# Patient Record
Sex: MALE | Race: WHITE | Employment: FULL TIME | URBAN - NONMETROPOLITAN AREA
[De-identification: names, ages, dates, MRNs, and addresses within clinical notes are randomized per-mention and may not be internally consistent; named-entity substitution may affect disease eponyms.]

---

## 2021-03-11 ENCOUNTER — IMMUNIZATION (OUTPATIENT)
Dept: FAMILY MEDICINE | Facility: OTHER | Age: 46
End: 2021-03-11
Attending: FAMILY MEDICINE
Payer: COMMERCIAL

## 2021-03-11 PROCEDURE — 91300 PR COVID VAC PFIZER DIL RECON 30 MCG/0.3 ML IM: CPT

## 2021-03-11 PROCEDURE — 0001A PR COVID VAC PFIZER DIL RECON 30 MCG/0.3 ML IM: CPT

## 2021-03-30 ENCOUNTER — IMMUNIZATION (OUTPATIENT)
Dept: FAMILY MEDICINE | Facility: OTHER | Age: 46
End: 2021-03-30
Attending: FAMILY MEDICINE
Payer: COMMERCIAL

## 2021-03-30 PROCEDURE — 0002A PR COVID VAC PFIZER DIL RECON 30 MCG/0.3 ML IM: CPT

## 2021-03-30 PROCEDURE — 91300 PR COVID VAC PFIZER DIL RECON 30 MCG/0.3 ML IM: CPT

## 2021-11-28 ENCOUNTER — HEALTH MAINTENANCE LETTER (OUTPATIENT)
Age: 46
End: 2021-11-28

## 2022-09-04 ENCOUNTER — HEALTH MAINTENANCE LETTER (OUTPATIENT)
Age: 47
End: 2022-09-04

## 2023-01-15 ENCOUNTER — HEALTH MAINTENANCE LETTER (OUTPATIENT)
Age: 48
End: 2023-01-15

## 2024-02-18 ENCOUNTER — HEALTH MAINTENANCE LETTER (OUTPATIENT)
Age: 49
End: 2024-02-18

## 2024-02-26 ENCOUNTER — HOSPITAL ENCOUNTER (OUTPATIENT)
Facility: HOSPITAL | Age: 49
Setting detail: RECURRING SERIES
Discharge: HOME | End: 2024-02-29
Payer: COMMERCIAL

## 2024-02-26 PROCEDURE — 97110 THERAPEUTIC EXERCISES: CPT

## 2024-02-26 PROCEDURE — 97162 PT EVAL MOD COMPLEX 30 MIN: CPT

## 2024-02-26 NOTE — PROGRESS NOTES
PHYSICAL / OCCUPATIONAL THERAPY - DAILY TREATMENT NOTE (updated )  For Eval visit    Patient Name: Dejuan Church    Date: 2024    : 1975  Insurance: Payor: AETNA / Plan: AETNA / Product Type: *No Product type* /      Patient  verified yes     Visit #   Current / Total 1 24   Time   In / Out 1105 1135   Pain   In / Out 1/10 1/10   Subjective Functional Status/Changes: See POC     TREATMENT AREA =  see POC    OBJECTIVE           20 min   Eval - untimed                      Therapeutic Procedures:    Tx Min Billable or 1:1 Min (if diff from Tx Min) Procedure, Rationale, Specifics   10  63410 Therapeutic Exercise (timed):  increase ROM, strength, coordination, balance, and proprioception to improve patient's ability to progress to PLOF and address remaining functional goals. (see flow sheet as applicable)     Details if applicable:              Details if applicable:            Details if applicable:            Details if applicable:            Details if applicable:     10  [facility] Totals Reminder: bill using total billable min of TIMED therapeutic procedures (example: do not include dry needle or estim unattended, both untimed codes, in totals to left)  8-22 min = 1 unit; 23-37 min = 2 units; 38-52 min = 3 units; 53-67 min = 4 units; 68-82 min = 5 units   Total Total     [x]  Patient Education billed concurrently with other procedures   [x] Review HEP    [] Progressed/Changed HEP, detail:    [] Other detail:       Objective Information/Functional Measures/Assessment    See POC    Patient will continue to benefit from skilled PT / OT services to modify and progress therapeutic interventions, analyze and address functional mobility deficits, analyze and address ROM deficits, analyze and address strength deficits, analyze and address soft tissue restrictions, analyze and cue for proper movement patterns, analyze and modify for postural abnormalities, and analyze and address

## 2024-02-26 NOTE — THERAPY EVALUATION
[facility]  [address] [place] [address]  Phone: [phone]   [phone]  Plan of Care / Statement of Necessity for Physical Therapy Services     Patient Name: Dejuan Church : 1975   Medical   Diagnosis: Pain in right shoulder [M25.511] Treatment Diagnosis:  M25.511  RIGHT SHOULDER PAIN    Onset Date: DOS 24     Referral Source: Terry Almonte MD Start of Care (SOC): 2024   Prior Hospitalization: See medical history Provider #: [id number]   Prior Level of Function: No difficulty with reaching, lifting, pushing, pulling, carrying or recreational activities.    Comorbidities: None     Assessment / key information: Pt is a 49 y/o male who presents to PT w/ c/o R shoulder pain s/p R clavicle fracture and resulting ORIF on 24. Patient states on 24 he was playing hockey when he fell full bodyweight on his R shoulder. Patient went to ER and was diagnosed with a clavicle fracture requiring surgical intervention. Patient states he has been doing well since surgery with minimal pain in his clavicle and instead feeling more pain around his shoulder. Patient also had experienced fluid accumulation around the surgical site which was thought to potentially be an infection, and was placed on anti-biotics. Patient feels the area looks better now and will follow up with the MD on 24 to rule out this out. Patient has maintained consistency with his sling. Pt notes pain ranges 1/10 to 4/10, made worse with active movement of R shoulder; better with rest. Describes pain as achy, located R shoulder. Testing/imaging has included x-rays before and after surgery. Prior treatment has included none. Red flags none. FOTO 39/100     Clinical Exam Findings:  POSTURE/OBSERVATION: Patient currently utilizing sling in correct position. Incision present over R clavicle consistent with surgical procedure. Minor drainage noted on guaze

## 2024-02-29 ENCOUNTER — HOSPITAL ENCOUNTER (OUTPATIENT)
Facility: HOSPITAL | Age: 49
Setting detail: RECURRING SERIES
End: 2024-02-29
Payer: COMMERCIAL

## 2024-02-29 PROCEDURE — 97140 MANUAL THERAPY 1/> REGIONS: CPT

## 2024-02-29 PROCEDURE — 97110 THERAPEUTIC EXERCISES: CPT

## 2024-02-29 PROCEDURE — 97530 THERAPEUTIC ACTIVITIES: CPT

## 2024-02-29 NOTE — PROGRESS NOTES
PHYSICAL / OCCUPATIONAL THERAPY - DAILY TREATMENT NOTE    Patient Name: Dejuan Church    Date: 2024    : 1975  Insurance: Payor: AETNA / Plan: AETNA / Product Type: *No Product type* /      Patient  verified Yes     Visit #   Current / Total 2 24   Time   In / Out 1106 1136   Pain   In / Out 010 0/10   Subjective Functional Status/Changes: Patient reports he didn't experience any residual soreness after his initial evaluation. Patient reports the follow up with his MD went well and they don't think there is any infection. Patient follows up again with their office next week once he completes the anti-biotics.      TREATMENT AREA =  Pain in right shoulder [M25.511]    OBJECTIVE         Therapeutic Procedures:    Tx Min Billable or 1:1 Min (if diff from Tx Min) Procedure, Rationale, Specifics   10  77889 Therapeutic Exercise (timed):  increase ROM, strength, coordination, balance, and proprioception to improve patient's ability to progress to PLOF and address remaining functional goals. (see flow sheet as applicable)     Details if applicable:       8  74940 Therapeutic Activity (timed):  use of dynamic activities replicating functional movements to increase ROM, strength, coordination, balance, and proprioception in order to improve patient's ability to progress to PLOF and address remaining functional goals.  (see flow sheet as applicable)     Details if applicable:     12  12496 Manual Therapy (timed):  decrease pain, increase ROM, increase tissue extensibility, and decrease trigger points to improve patient's ability to progress to PLOF and address remaining functional goals.  The manual therapy interventions were performed at a separate and distinct time from the therapeutic activities interventions . (see flow sheet as applicable)     Details if applicable:  STM to R infraspinatus, R anterior deltoid; R shoulder PROM into all planes all performed in supine          Details if

## 2024-03-06 ENCOUNTER — HOSPITAL ENCOUNTER (OUTPATIENT)
Facility: HOSPITAL | Age: 49
Setting detail: RECURRING SERIES
Discharge: HOME | End: 2024-03-09
Payer: COMMERCIAL

## 2024-03-06 PROCEDURE — 97110 THERAPEUTIC EXERCISES: CPT

## 2024-03-06 PROCEDURE — 97140 MANUAL THERAPY 1/> REGIONS: CPT

## 2024-03-06 PROCEDURE — 97530 THERAPEUTIC ACTIVITIES: CPT

## 2024-03-06 NOTE — PROGRESS NOTES
PHYSICAL / OCCUPATIONAL THERAPY - DAILY TREATMENT NOTE    Patient Name: Dejuan Church    Date: 3/6/2024    : 1975  Insurance: Payor: AETNA / Plan: AETNA / Product Type: *No Product type* /      Patient  verified Yes     Visit #   Current / Total 3 24   Time   In / Out 221 259   Pain   In / Out 0/10 010   Subjective Functional Status/Changes: Patient reports the front of his shoulder/chest feels tight and lightly spasms. Patient denies any increased pain after his last session     TREATMENT AREA =  Pain in right shoulder [M25.511]    OBJECTIVE         Therapeutic Procedures:    Tx Min Billable or 1:1 Min (if diff from Tx Min) Procedure, Rationale, Specifics   18  50595 Therapeutic Exercise (timed):  increase ROM, strength, coordination, balance, and proprioception to improve patient's ability to progress to PLOF and address remaining functional goals. (see flow sheet as applicable)     Details if applicable:       10  08771 Therapeutic Activity (timed):  use of dynamic activities replicating functional movements to increase ROM, strength, coordination, balance, and proprioception in order to improve patient's ability to progress to PLOF and address remaining functional goals.  (see flow sheet as applicable)     Details if applicable:     10  01895 Manual Therapy (timed):  decrease pain, increase ROM, increase tissue extensibility, and decrease trigger points to improve patient's ability to progress to PLOF and address remaining functional goals.  The manual therapy interventions were performed at a separate and distinct time from the therapeutic activities interventions . (see flow sheet as applicable)     Details if applicable:  STM to R infraspinatus, R anterior deltoid; R shoulder PROM into all planes all performed in supine           Details if applicable:            Details if applicable:     38  [facility] Totals Reminder: bill using total billable min of TIMED therapeutic procedures (example:

## 2024-03-08 ENCOUNTER — HOSPITAL ENCOUNTER (OUTPATIENT)
Facility: HOSPITAL | Age: 49
Setting detail: RECURRING SERIES
Discharge: HOME | End: 2024-03-11
Payer: COMMERCIAL

## 2024-03-08 PROCEDURE — 97110 THERAPEUTIC EXERCISES: CPT

## 2024-03-08 PROCEDURE — 97530 THERAPEUTIC ACTIVITIES: CPT

## 2024-03-08 PROCEDURE — 97140 MANUAL THERAPY 1/> REGIONS: CPT

## 2024-03-08 NOTE — PROGRESS NOTES
dry needle or estim unattended, both untimed codes, in totals to left)  8-22 min = 1 unit; 23-37 min = 2 units; 38-52 min = 3 units; 53-67 min = 4 units; 68-82 min = 5 units   Total Total     [x]  Patient Education billed concurrently with other procedures   [x] Review HEP    [] Progressed/Changed HEP, detail:    [] Other detail:       Objective Information/Functional Measures/Assessment    Added wall slides with FR and retro UBE this session for improved shoulder mobility and ROM. Patient demonstrating gains in both quantity and quality of AROM into scaption compared to previous session. Patient is progressing appropriate with his PT program at this time.     Patient will continue to benefit from skilled PT / OT services to modify and progress therapeutic interventions, analyze and address functional mobility deficits, analyze and address ROM deficits, analyze and address strength deficits, analyze and address soft tissue restrictions, analyze and cue for proper movement patterns, and analyze and modify for postural abnormalities to address functional deficits and attain remaining goals.    Progress toward goals / Updated goals:  []  See Progress Note/Recertification    Short Term Goals: To be accomplished in 6 weeks  Patient will demonstrate independence with HEP for self management of symptoms. (2/29/24): patient reports compliance with HEP  Patient will demonstrate ability to perform R shoulder flexion and abduction AROM to 90 degrees with proper mechanics in order to improve tolerance to household ADL's. (3/8/24): performed scaption to 90 degrees this session with good mechanics  Patient will demonstrate ability to perform R shoulder FIR AROM to L1 in order to improve tolerance to dressing activities.   Long Term Goals: To be accomplished in 12 weeks  Patient will improve FOTO to >/= 71/100 in order to improve quality of life.  Patient will improve R shoulder flexion and abduction AROM to >/= 160 degrees with

## 2024-03-11 ENCOUNTER — HOSPITAL ENCOUNTER (OUTPATIENT)
Facility: HOSPITAL | Age: 49
Setting detail: RECURRING SERIES
Discharge: HOME | End: 2024-03-14
Payer: COMMERCIAL

## 2024-03-11 PROCEDURE — 97530 THERAPEUTIC ACTIVITIES: CPT

## 2024-03-11 PROCEDURE — 97110 THERAPEUTIC EXERCISES: CPT

## 2024-03-11 PROCEDURE — 97140 MANUAL THERAPY 1/> REGIONS: CPT

## 2024-03-14 ENCOUNTER — HOSPITAL ENCOUNTER (OUTPATIENT)
Facility: HOSPITAL | Age: 49
Setting detail: RECURRING SERIES
Discharge: HOME | End: 2024-03-17
Payer: COMMERCIAL

## 2024-03-14 PROCEDURE — 97110 THERAPEUTIC EXERCISES: CPT

## 2024-03-14 PROCEDURE — 97140 MANUAL THERAPY 1/> REGIONS: CPT

## 2024-03-14 PROCEDURE — 97530 THERAPEUTIC ACTIVITIES: CPT

## 2024-03-14 NOTE — PROGRESS NOTES
untimed codes, in totals to left)  8-22 min = 1 unit; 23-37 min = 2 units; 38-52 min = 3 units; 53-67 min = 4 units; 68-82 min = 5 units   Total Total     [x]  Patient Education billed concurrently with other procedures   [x] Review HEP    [] Progressed/Changed HEP, detail:    [] Other detail:       Objective Information/Functional Measures/Assessment    Attempted single arm pec stretch in doorway, however patient unable to perform secondary to severe shoulder pain. Attempted low pec stretch in doorway, however minimal stretch noted. Educated patient on perform supine pec stretch with FR or pillows under thoracic spine, patient acknowledged understanding. Continues to demonstrate improved tolerance to R shoulder scaption AROM per performance in clinic.     Patient will continue to benefit from skilled PT / OT services to modify and progress therapeutic interventions, analyze and address functional mobility deficits, analyze and address ROM deficits, analyze and address strength deficits, analyze and address soft tissue restrictions, analyze and cue for proper movement patterns, and analyze and modify for postural abnormalities to address functional deficits and attain remaining goals.    Progress toward goals / Updated goals:  []  See Progress Note/Recertification    Short Term Goals: To be accomplished in 6 weeks  Patient will demonstrate independence with HEP for self management of symptoms. (2/29/24): patient reports compliance with HEP  Patient will demonstrate ability to perform R shoulder flexion and abduction AROM to 90 degrees with proper mechanics in order to improve tolerance to household ADL's. (3/8/24): performed scaption to 90 degrees this session with good mechanics  Patient will demonstrate ability to perform R shoulder FIR AROM to L1 in order to improve tolerance to dressing activities.   Long Term Goals: To be accomplished in 12 weeks  Patient will improve FOTO to >/= 71/100 in order to improve

## 2024-03-20 ENCOUNTER — HOSPITAL ENCOUNTER (OUTPATIENT)
Facility: HOSPITAL | Age: 49
Setting detail: RECURRING SERIES
Discharge: HOME | End: 2024-03-23
Payer: COMMERCIAL

## 2024-03-20 PROCEDURE — 97140 MANUAL THERAPY 1/> REGIONS: CPT

## 2024-03-20 PROCEDURE — 97110 THERAPEUTIC EXERCISES: CPT

## 2024-03-20 PROCEDURE — 97530 THERAPEUTIC ACTIVITIES: CPT

## 2024-03-20 NOTE — THERAPY RECERTIFICATION
[facility]  [address] [place] [address]  Phone: [phone]   [phone]  PHYSICAL THERAPY PROGRESS NOTE  Patient Name: Dejuna Church : 1975   Treatment/Medical Diagnosis: Pain in right shoulder [M25.511]   Referral Source: Terry Almonte MD     Date of Initial Visit: 24 Attended Visits: 7 Missed Visits: 0     SUMMARY OF TREATMENT  Patient has attended 6 follow up visits since his initial evaluation on 24 s/p R clavicle ORIF (DOS: 24). Patient has received therapeutic exercise, therapeutic activity, NMRE and manual therapy in order to improve R shoulder ROM, mobility, flexibility, strength, stability and pain reduction.     CURRENT STATUS  Patient demonstrates good improvement in symptoms since the start of PT. Patient has been progressing appropriately with his AROM and strength based upon surgical date. Patient is also likely experiencing residual shoulder pathology including shoulder impingement and possible labral damage which has reduced additional progress at this time.     % improvement: 55%  Max pain 6-7/10  Avg pain 0/10  Min pain 0/10    Current improvements: improvements in ROM, mild improvements in strength, improved tolerance to dressing and ADL's performed at or below shoulder height  Remaining functional limitations: Difficulty reaching fully OH, reaching behind his back, lifting and carrying activities.     Objective measures:  R shoulder AROM: flex 117 (P!), abd 115 (P!), ISMA C5, FIR R glute (P!)    R shoulder strength: flex 4-/5, abd 4-/5 (P!), ER 4-/5 (P!), IR 4-/5 (P!)      FOTO 59/100; GROC +4    SHORT TERM GOALS:  Patient will demonstrate independence with HEP for self management of symptoms   Status at last Eval: Issued at IE  Current Status: Inpendent with current HEP  Goal Met?  yes    2.  Patient will demonstrate ability to perform R shoulder flexion and abduction AROM to 90

## 2024-03-20 NOTE — PROGRESS NOTES
PHYSICAL / OCCUPATIONAL THERAPY - DAILY TREATMENT NOTE    Patient Name: Dejuan Church    Date: 3/20/2024    : 1975  Insurance: Payor: AETNA / Plan: AETNA / Product Type: *No Product type* /      Patient  verified Yes     Visit #   Current / Total 7 24   Time   In / Out 421 502   Pain   In / Out 0/10 0/10   Subjective Functional Status/Changes: Patient reports a 55% improvement in symptoms since the start of therapy. Patient notes improvements in his ROM and strength, particularly below shoulder height. Patient does continue to have difficulty reaching OH.      TREATMENT AREA =  Pain in right shoulder [M25.511]    OBJECTIVE         Therapeutic Procedures:    Tx Min Billable or 1:1 Min (if diff from Tx Min) Procedure, Rationale, Specifics   19  41047 Therapeutic Exercise (timed):  increase ROM, strength, coordination, balance, and proprioception to improve patient's ability to progress to PLOF and address remaining functional goals. (see flow sheet as applicable)     Details if applicable:       10  97519 Therapeutic Activity (timed):  use of dynamic activities replicating functional movements to increase ROM, strength, coordination, balance, and proprioception in order to improve patient's ability to progress to PLOF and address remaining functional goals.  (see flow sheet as applicable)     Details if applicable:     12  04591 Manual Therapy (timed):  decrease pain, increase ROM, increase tissue extensibility, and decrease trigger points to improve patient's ability to progress to PLOF and address remaining functional goals.  The manual therapy interventions were performed at a separate and distinct time from the therapeutic activities interventions . (see flow sheet as applicable)     Details if applicable:  STM to R infraspinatus, R teres minor; R shoulder PROM all planes all performed in supine            Details if applicable:            Details if applicable:     41  [facility] Totals Reminder:

## 2024-03-25 ENCOUNTER — HOSPITAL ENCOUNTER (OUTPATIENT)
Facility: HOSPITAL | Age: 49
Setting detail: RECURRING SERIES
Discharge: HOME | End: 2024-03-28
Payer: COMMERCIAL

## 2024-03-25 PROCEDURE — 97530 THERAPEUTIC ACTIVITIES: CPT

## 2024-03-25 PROCEDURE — 97140 MANUAL THERAPY 1/> REGIONS: CPT

## 2024-03-25 PROCEDURE — 97110 THERAPEUTIC EXERCISES: CPT

## 2024-03-25 NOTE — PROGRESS NOTES
PHYSICAL / OCCUPATIONAL THERAPY - DAILY TREATMENT NOTE    Patient Name: Dejuan Church    Date: 3/25/2024    : 1975  Insurance: Payor: AETNA / Plan: AETNA / Product Type: *No Product type* /      Patient  verified Yes     Visit #   Current / Total 8 24   Time   In / Out 1224 1259   Pain   In / Out 0/10 0/10   Subjective Functional Status/Changes: Patient reports his follow up appointment went well and the MD was pleased with his ROM. Patient denies complications or limitations over the weekend.      TREATMENT AREA =  Pain in right shoulder [M25.511]    OBJECTIVE         Therapeutic Procedures:    Tx Min Billable or 1:1 Min (if diff from Tx Min) Procedure, Rationale, Specifics   15  54442 Therapeutic Exercise (timed):  increase ROM, strength, coordination, balance, and proprioception to improve patient's ability to progress to PLOF and address remaining functional goals. (see flow sheet as applicable)     Details if applicable:       10  60700 Manual Therapy (timed):  decrease pain, increase ROM, increase tissue extensibility, and decrease trigger points to improve patient's ability to progress to PLOF and address remaining functional goals.  The manual therapy interventions were performed at a separate and distinct time from the therapeutic activities interventions . (see flow sheet as applicable)     Details if applicable:   STM to R infraspinatus, R teres minor; R shoulder PROM all planes all performed in supine    10  30019 Therapeutic Activity (timed):  use of dynamic activities replicating functional movements to increase ROM, strength, coordination, balance, and proprioception in order to improve patient's ability to progress to PLOF and address remaining functional goals.  (see flow sheet as applicable)     Details if applicable:            Details if applicable:            Details if applicable:       [facility] Totals Reminder: bill using total billable min of TIMED therapeutic procedures

## 2024-03-27 ENCOUNTER — HOSPITAL ENCOUNTER (OUTPATIENT)
Facility: HOSPITAL | Age: 49
Setting detail: RECURRING SERIES
Discharge: HOME | End: 2024-03-30
Payer: COMMERCIAL

## 2024-03-27 PROCEDURE — 97140 MANUAL THERAPY 1/> REGIONS: CPT

## 2024-03-27 PROCEDURE — 97110 THERAPEUTIC EXERCISES: CPT

## 2024-03-27 PROCEDURE — 97530 THERAPEUTIC ACTIVITIES: CPT

## 2024-03-27 NOTE — PROGRESS NOTES
Continue Plan of Care  - Upgrade activities as tolerated    JOSH SOTOMAYOR, KIEL    3/27/2024    12:26 PM    Future Appointments   Date Time Provider Department Center   4/2/2024  2:20 PM Chana Carrera, PT [facility]   4/5/2024  1:40 PM Chana Carrera, PT [facility]   4/9/2024  2:20 PM Josh Sotomayor, PT [facility]   4/12/2024  2:20 PM Josh Sotomayor, PT [facility]   4/15/2024  2:20 PM Josh Sotomayor, PT [facility]   4/18/2024  7:40 AM Josh Sotomayor, PT [facility]   4/23/2024  7:40 AM Josh Sotomayor, PT [facility]   4/25/2024 11:00 AM Chana Carrera, PT [facility]   4/29/2024  2:20 PM Josh Sotomayor, PT [facility]

## 2024-04-02 ENCOUNTER — HOSPITAL ENCOUNTER (OUTPATIENT)
Facility: HOSPITAL | Age: 49
Setting detail: RECURRING SERIES
Discharge: HOME | End: 2024-04-05
Payer: COMMERCIAL

## 2024-04-02 PROCEDURE — 97110 THERAPEUTIC EXERCISES: CPT

## 2024-04-02 PROCEDURE — 97140 MANUAL THERAPY 1/> REGIONS: CPT

## 2024-04-02 PROCEDURE — 97530 THERAPEUTIC ACTIVITIES: CPT

## 2024-04-02 NOTE — PROGRESS NOTES
PHYSICAL / OCCUPATIONAL THERAPY - DAILY TREATMENT NOTE    Patient Name: Dejuan Church    Date: 2024    : 1975  Insurance: Payor: AETNA / Plan: AETNA / Product Type: *No Product type* /      Patient  verified Yes     Visit #   Current / Total 10 24   Time   In / Out 2:21 2:55   Pain   In / Out 0/10 0/10   Subjective Functional Status/Changes: Pt reports his shoulder has been feeling good. No new complaints.      TREATMENT AREA =  Pain in right shoulder [M25.511]    OBJECTIVE         Therapeutic Procedures:    Tx Min Billable or 1:1 Min (if diff from Tx Min) Procedure, Rationale, Specifics   16  70099 Therapeutic Exercise (timed):  increase ROM, strength, coordination, balance, and proprioception to improve patient's ability to progress to PLOF and address remaining functional goals. (see flow sheet as applicable)     Details if applicable:       8  98165 Therapeutic Activity (timed):  use of dynamic activities replicating functional movements to increase ROM, strength, coordination, balance, and proprioception in order to improve patient's ability to progress to PLOF and address remaining functional goals.  (see flow sheet as applicable)     Details if applicable:     10  99565 Manual Therapy (timed):  decrease pain, increase ROM, increase tissue extensibility, and decrease trigger points to improve patient's ability to progress to PLOF and address remaining functional goals.  The manual therapy interventions were performed at a separate and distinct time from the therapeutic activities interventions . (see flow sheet as applicable)     Details if applicable:  STM to R teres minor, infraspinatus, subclavian, pec minor. PROM flex, abd, ER, IR          Details if applicable:            Details if applicable:     34  [facility] Totals Reminder: bill using total billable min of TIMED therapeutic procedures (example: do not include dry needle or estim unattended, both untimed codes, in totals to

## 2024-04-05 ENCOUNTER — HOSPITAL ENCOUNTER (OUTPATIENT)
Facility: HOSPITAL | Age: 49
Setting detail: RECURRING SERIES
Discharge: HOME | End: 2024-04-08
Payer: COMMERCIAL

## 2024-04-05 PROCEDURE — 97140 MANUAL THERAPY 1/> REGIONS: CPT

## 2024-04-05 PROCEDURE — 97530 THERAPEUTIC ACTIVITIES: CPT

## 2024-04-05 PROCEDURE — 97110 THERAPEUTIC EXERCISES: CPT

## 2024-04-05 NOTE — PROGRESS NOTES
PHYSICAL / OCCUPATIONAL THERAPY - DAILY TREATMENT NOTE    Patient Name: Dejuan Church    Date: 2024    : 1975  Insurance: Payor: AETNA / Plan: AETNA / Product Type: *No Product type* /      Patient  verified Yes     Visit #   Current / Total 11 24   Time   In / Out 1:40 2:20   Pain   In / Out 0/10 0/10   Subjective Functional Status/Changes: Pt reports he continues to have sharp, pinching pain when he does things too quickly. States he avoids things that cause pain, therefore has not had any pain in the last week.      TREATMENT AREA =  Pain in right shoulder [M25.511]    OBJECTIVE         Therapeutic Procedures:    Tx Min Billable or 1:1 Min (if diff from Tx Min) Procedure, Rationale, Specifics   15  37502 Therapeutic Exercise (timed):  increase ROM, strength, coordination, balance, and proprioception to improve patient's ability to progress to PLOF and address remaining functional goals. (see flow sheet as applicable)     Details if applicable:       15  95896 Therapeutic Activity (timed):  use of dynamic activities replicating functional movements to increase ROM, strength, coordination, balance, and proprioception in order to improve patient's ability to progress to PLOF and address remaining functional goals.  (see flow sheet as applicable)     Details if applicable:     10  97007 Manual Therapy (timed):  decrease pain, increase ROM, increase tissue extensibility, and decrease trigger points to improve patient's ability to progress to PLOF and address remaining functional goals.  The manual therapy interventions were performed at a separate and distinct time from the therapeutic activities interventions . (see flow sheet as applicable)     Details if applicable:  STM to R teres minor, infraspinatus, subclavian, pec minor. PROM flex, abd, ER, IR          Details if applicable:            Details if applicable:     40  [facility] Totals Reminder: bill using total billable min of TIMED

## 2024-04-09 ENCOUNTER — HOSPITAL ENCOUNTER (OUTPATIENT)
Facility: HOSPITAL | Age: 49
Setting detail: RECURRING SERIES
Discharge: HOME | End: 2024-04-12
Payer: COMMERCIAL

## 2024-04-09 PROCEDURE — 97110 THERAPEUTIC EXERCISES: CPT

## 2024-04-09 PROCEDURE — 97140 MANUAL THERAPY 1/> REGIONS: CPT

## 2024-04-09 PROCEDURE — 97530 THERAPEUTIC ACTIVITIES: CPT

## 2024-04-09 NOTE — PROGRESS NOTES
PHYSICAL / OCCUPATIONAL THERAPY - DAILY TREATMENT NOTE    Patient Name: Dejuan Church    Date: 2024    : 1975  Insurance: Payor: AETNA / Plan: AETNA / Product Type: *No Product type* /      Patient  verified Yes     Visit #   Current / Total 12 24   Time   In / Out 223 304   Pain   In / Out 0/10 010   Subjective Functional Status/Changes: Patient reports his shoulder has been doing well since his last appointment. Patient notes he continues to have difficulty reaching behind his back.      TREATMENT AREA =  Pain in right shoulder [M25.511]    OBJECTIVE         Therapeutic Procedures:    Tx Min Billable or 1:1 Min (if diff from Tx Min) Procedure, Rationale, Specifics   18  00062 Therapeutic Exercise (timed):  increase ROM, strength, coordination, balance, and proprioception to improve patient's ability to progress to PLOF and address remaining functional goals. (see flow sheet as applicable)     Details if applicable:       13  31951 Therapeutic Activity (timed):  use of dynamic activities replicating functional movements to increase ROM, strength, coordination, balance, and proprioception in order to improve patient's ability to progress to PLOF and address remaining functional goals.  (see flow sheet as applicable)     Details if applicable:     10  00314 Manual Therapy (timed):  decrease pain, increase ROM, increase tissue extensibility, and decrease trigger points to improve patient's ability to progress to PLOF and address remaining functional goals.  The manual therapy interventions were performed at a separate and distinct time from the therapeutic activities interventions . (see flow sheet as applicable)     Details if applicable:  STM to R infraspinatus, R teres minor; R shoulder PROM all planes all performed in supine            Details if applicable:            Details if applicable:     41  [facility] Totals Reminder: bill using total billable min of TIMED therapeutic procedures

## 2024-04-12 ENCOUNTER — HOSPITAL ENCOUNTER (OUTPATIENT)
Facility: HOSPITAL | Age: 49
Setting detail: RECURRING SERIES
Discharge: HOME | End: 2024-04-15
Payer: COMMERCIAL

## 2024-04-12 PROCEDURE — 97530 THERAPEUTIC ACTIVITIES: CPT

## 2024-04-12 PROCEDURE — 97140 MANUAL THERAPY 1/> REGIONS: CPT

## 2024-04-12 PROCEDURE — 97110 THERAPEUTIC EXERCISES: CPT

## 2024-04-12 NOTE — PROGRESS NOTES
PHYSICAL / OCCUPATIONAL THERAPY - DAILY TREATMENT NOTE    Patient Name: Dejuan Church    Date: 2024    : 1975  Insurance: Payor: AETNA / Plan: AETNA / Product Type: *No Product type* /      Patient  verified Yes     Visit #   Current / Total 13 24   Time   In / Out 222 256   Pain   In / Out 0/10 0/10   Subjective Functional Status/Changes: Patient states his shoulder is feeling good. Patient does notice some limitations reaching OH, feeling like his shoulder is \"packed\" in the front.      TREATMENT AREA =  Pain in right shoulder [M25.511]    OBJECTIVE         Therapeutic Procedures:    Tx Min Billable or 1:1 Min (if diff from Tx Min) Procedure, Rationale, Specifics   14  89798 Therapeutic Exercise (timed):  increase ROM, strength, coordination, balance, and proprioception to improve patient's ability to progress to PLOF and address remaining functional goals. (see flow sheet as applicable)     Details if applicable:       10  17839 Therapeutic Activity (timed):  use of dynamic activities replicating functional movements to increase ROM, strength, coordination, balance, and proprioception in order to improve patient's ability to progress to PLOF and address remaining functional goals.  (see flow sheet as applicable)     Details if applicable:     10  81489 Manual Therapy (timed):  decrease pain, increase ROM, increase tissue extensibility, and decrease trigger points to improve patient's ability to progress to PLOF and address remaining functional goals.  The manual therapy interventions were performed at a separate and distinct time from the therapeutic activities interventions . (see flow sheet as applicable)     Details if applicable:   STM to R infraspinatus, R teres minor; R shoulder PROM all planes all performed in supine            Details if applicable:            Details if applicable:     34  [facility] Totals Reminder: bill using total billable min of TIMED therapeutic procedures

## 2024-04-15 ENCOUNTER — HOSPITAL ENCOUNTER (OUTPATIENT)
Facility: HOSPITAL | Age: 49
Setting detail: RECURRING SERIES
Discharge: HOME | End: 2024-04-18
Payer: COMMERCIAL

## 2024-04-15 PROCEDURE — 97140 MANUAL THERAPY 1/> REGIONS: CPT

## 2024-04-15 PROCEDURE — 97530 THERAPEUTIC ACTIVITIES: CPT

## 2024-04-15 PROCEDURE — 97110 THERAPEUTIC EXERCISES: CPT

## 2024-04-18 ENCOUNTER — HOSPITAL ENCOUNTER (OUTPATIENT)
Facility: HOSPITAL | Age: 49
Setting detail: RECURRING SERIES
Discharge: HOME | End: 2024-04-21
Payer: COMMERCIAL

## 2024-04-18 PROCEDURE — 97110 THERAPEUTIC EXERCISES: CPT

## 2024-04-18 PROCEDURE — 97140 MANUAL THERAPY 1/> REGIONS: CPT

## 2024-04-18 PROCEDURE — 97530 THERAPEUTIC ACTIVITIES: CPT

## 2024-04-18 NOTE — PROGRESS NOTES
[facility]  [address] [address]  Phone: [phone]   [phone]  PHYSICAL THERAPY PROGRESS NOTE  Patient Name: Dejuan Church : 1975   Treatment/Medical Diagnosis: Pain in right shoulder [M25.511]   Referral Source: Terry Almonte MD     Date of Initial Visit: 24 Attended Visits: 15 Missed Visits: 1     SUMMARY OF TREATMENT  Patient has attended 14 follow up visits since his initial evaluation on on 24 s/p R clavicle ORIF (DOS: 24). Patient has received therapeutic exercise, therapeutic activity, NMRE and manual therapy in order to improve R shoulder ROM, mobility, flexibility, strength, stability and pain reduction.     CURRENT STATUS  % improvement: 80-85%  Max pain 3-4/10  Avg pain 0/10  Min pain 0/10    Current improvements: Improved ROM and strength when working below and slightly above shoulder height. Minimal pain with daily activities.  Remaining functional limitations: Pain with quick movements, limited when reaching fully OH, has not returned to golf or hockey activities.     Objective measures:  R shoulder AROM: flex 143, abd 150, ISMA C7, FIR T10 (P!)    R shoulder strength: flex 4/5, abd 4/5 (P!), ER 4/5, IR 4+/5 (P!)    FOTO 66/100; GROC +5      LONG TERM GOALS:     Patient will improve FOTO to >/= 71/100 in order to improve quality of life   Status at last Eval: 59/100  Current Status: 66/100  Goal Met?   progressing    2.  Patient will improve R shoulder flexion and abduction AROM to >/= 160 degrees with proper mechanics in order to improve tolerance to reaching activities   Status at last Eval: R shoulder AROM: flex 117 (P!), abd 115 (P!)   Current Status: R shoulder AROM: flex 143, abd 150,  Goal Met?   Progressing    3. Patient will improve R shoulder strength to 5/5 for all planes in order to return to recreational activities.   Status at last Eval: R shoulder strength: flex 4-/5, 
  40  [facility] Totals Reminder: bill using total billable min of TIMED therapeutic procedures (example: do not include dry needle or estim unattended, both untimed codes, in totals to left)  8-22 min = 1 unit; 23-37 min = 2 units; 38-52 min = 3 units; 53-67 min = 4 units; 68-82 min = 5 units   Total Total     [x]  Patient Education billed concurrently with other procedures   [x] Review HEP    [] Progressed/Changed HEP, detail:    [] Other detail:       Objective Information/Functional Measures/Assessment    See PN    Patient will continue to benefit from skilled PT / OT services to modify and progress therapeutic interventions, analyze and address functional mobility deficits, analyze and address ROM deficits, analyze and address strength deficits, analyze and address soft tissue restrictions, analyze and cue for proper movement patterns, and analyze and modify for postural abnormalities to address functional deficits and attain remaining goals.    Progress toward goals / Updated goals:  [x]  See Progress Note/Recertification        Next PN/ RC due 5/18/24  Auth due (visit number/ date) 60 v; 12/31/24    PLAN  - Continue Plan of Care  - Upgrade activities as tolerated    KANE SOTOMAYOR, KIEL    4/18/2024    12:02 PM    Future Appointments   Date Time Provider Department Center   4/23/2024  7:40 AM Kane Sotomayor, PT [facility]   4/25/2024 11:00 AM Chana Carrera PT [facility]   4/29/2024  2:20 PM Kane Sotomayor, KIEL [facility]

## 2024-04-23 ENCOUNTER — HOSPITAL ENCOUNTER (OUTPATIENT)
Facility: HOSPITAL | Age: 49
Setting detail: RECURRING SERIES
Discharge: HOME | End: 2024-04-26
Payer: COMMERCIAL

## 2024-04-23 PROCEDURE — 97140 MANUAL THERAPY 1/> REGIONS: CPT

## 2024-04-23 PROCEDURE — 97110 THERAPEUTIC EXERCISES: CPT

## 2024-04-23 PROCEDURE — 97530 THERAPEUTIC ACTIVITIES: CPT

## 2024-04-23 NOTE — PROGRESS NOTES
PHYSICAL / OCCUPATIONAL THERAPY - DAILY TREATMENT NOTE    Patient Name: Dejuan Church    Date: 2024    : 1975  Insurance: Payor: AETNA / Plan: AETNA / Product Type: *No Product type* /      Patient  verified Yes     Visit #   Current / Total 16 24   Time   In / Out 742 822   Pain   In / Out 0/10 0/10   Subjective Functional Status/Changes: Patient reports the new stretches made his shoulder sore and tired. Patient has difficulty performing the ER stretch in the door if his shoulder isn't warmed up first.      TREATMENT AREA =  Pain in right shoulder [M25.511]    OBJECTIVE         Therapeutic Procedures:    Tx Min Billable or 1:1 Min (if diff from Tx Min) Procedure, Rationale, Specifics   20  57257 Therapeutic Exercise (timed):  increase ROM, strength, coordination, balance, and proprioception to improve patient's ability to progress to PLOF and address remaining functional goals. (see flow sheet as applicable)     Details if applicable:       10  19804 Therapeutic Activity (timed):  use of dynamic activities replicating functional movements to increase ROM, strength, coordination, balance, and proprioception in order to improve patient's ability to progress to PLOF and address remaining functional goals.  (see flow sheet as applicable)     Details if applicable:     10  05287 Manual Therapy (timed):  decrease pain, increase ROM, increase tissue extensibility, and decrease trigger points to improve patient's ability to progress to PLOF and address remaining functional goals.  The manual therapy interventions were performed at a separate and distinct time from the therapeutic activities interventions . (see flow sheet as applicable)     Details if applicable:  STM to R infraspinatus, R teres minor, R anterior deltoid and LHB; GH jt inferior capsule mobs; PROM into flex and ER all performed in supine          Details if applicable:            Details if applicable:     40  [facility] Totals

## 2024-04-25 ENCOUNTER — HOSPITAL ENCOUNTER (OUTPATIENT)
Facility: HOSPITAL | Age: 49
Setting detail: RECURRING SERIES
Discharge: HOME | End: 2024-04-28
Payer: COMMERCIAL

## 2024-04-25 PROCEDURE — 97110 THERAPEUTIC EXERCISES: CPT

## 2024-04-25 PROCEDURE — 97140 MANUAL THERAPY 1/> REGIONS: CPT

## 2024-04-25 PROCEDURE — 97530 THERAPEUTIC ACTIVITIES: CPT

## 2024-04-25 NOTE — PROGRESS NOTES
PHYSICAL / OCCUPATIONAL THERAPY - DAILY TREATMENT NOTE    Patient Name: Dejuan Church    Date: 2024    : 1975  Insurance: Payor: AETNA / Plan: AETNA / Product Type: *No Product type* /      Patient  verified Yes     Visit #   Current / Total 17 24   Time   In / Out 11:05 11:39   Pain   In / Out 0/10 3/10   Subjective Functional Status/Changes: Pt reports some soreness with new exercises but overall doing well. Moving ER stretch to end of HEP has been helpful in reducing pain.      TREATMENT AREA =  Pain in right shoulder [M25.511]    OBJECTIVE         Therapeutic Procedures:    Tx Min Billable or 1:1 Min (if diff from Tx Min) Procedure, Rationale, Specifics   10  36045 Therapeutic Exercise (timed):  increase ROM, strength, coordination, balance, and proprioception to improve patient's ability to progress to PLOF and address remaining functional goals. (see flow sheet as applicable)     Details if applicable:       530 Therapeutic Activity (timed):  use of dynamic activities replicating functional movements to increase ROM, strength, coordination, balance, and proprioception in order to improve patient's ability to progress to PLOF and address remaining functional goals.  (see flow sheet as applicable)     Details if applicable:     10  28138 Manual Therapy (timed):  decrease pain, increase ROM, increase tissue extensibility, and decrease trigger points to improve patient's ability to progress to PLOF and address remaining functional goals.  The manual therapy interventions were performed at a separate and distinct time from the therapeutic activities interventions . (see flow sheet as applicable)     Details if applicable:  STM to R infraspinatus, teres minor, ant delt, LHB; inf GHJ mob gr IV; PROM flex and ER          Details if applicable:            Details if applicable:     34  [facility] Totals Reminder: bill using total billable min of TIMED therapeutic procedures (example: do not

## 2024-04-29 ENCOUNTER — HOSPITAL ENCOUNTER (OUTPATIENT)
Facility: HOSPITAL | Age: 49
Setting detail: RECURRING SERIES
Discharge: HOME | End: 2024-05-02
Payer: COMMERCIAL

## 2024-04-29 PROCEDURE — 97110 THERAPEUTIC EXERCISES: CPT

## 2024-04-29 PROCEDURE — 97140 MANUAL THERAPY 1/> REGIONS: CPT

## 2024-04-29 PROCEDURE — 97530 THERAPEUTIC ACTIVITIES: CPT

## 2024-04-29 NOTE — PROGRESS NOTES
PHYSICAL / OCCUPATIONAL THERAPY - DAILY TREATMENT NOTE    Patient Name: Dejuan Church    Date: 2024    : 1975  Insurance: Payor: AETNA / Plan: AETNA / Product Type: *No Product type* /      Patient  verified Yes     Visit #   Current / Total 18 24   Time   In / Out 222 302   Pain   In / Out 0/10 0/10   Subjective Functional Status/Changes: Patient reports he feels like the stretch in the doorway is getting easier. Patient was able to do a lot of yard work over the weekend without increased pain. Patient does note a significant sharp pain followed by \"dead arm\" after quick, sudden movements such as throwing a Frisbee.      TREATMENT AREA =  Pain in right shoulder [M25.511]    OBJECTIVE         Therapeutic Procedures:    Tx Min Billable or 1:1 Min (if diff from Tx Min) Procedure, Rationale, Specifics   20  92376 Therapeutic Exercise (timed):  increase ROM, strength, coordination, balance, and proprioception to improve patient's ability to progress to PLOF and address remaining functional goals. (see flow sheet as applicable)     Details if applicable:       10  83927 Therapeutic Activity (timed):  use of dynamic activities replicating functional movements to increase ROM, strength, coordination, balance, and proprioception in order to improve patient's ability to progress to PLOF and address remaining functional goals.  (see flow sheet as applicable)     Details if applicable:     10  62475 Manual Therapy (timed):  decrease pain, increase ROM, increase tissue extensibility, and decrease trigger points to improve patient's ability to progress to PLOF and address remaining functional goals.  The manual therapy interventions were performed at a separate and distinct time from the therapeutic activities interventions . (see flow sheet as applicable)     Details if applicable:  STM to R infraspinatus, R teres minor, R LHB; R shoulder PC/IC mobs; R shoulder PROM into flex, ER all performed in

## 2024-05-03 ENCOUNTER — APPOINTMENT (OUTPATIENT)
Facility: HOSPITAL | Age: 49
End: 2024-05-03
Payer: COMMERCIAL

## 2024-05-06 ENCOUNTER — HOSPITAL ENCOUNTER (OUTPATIENT)
Facility: HOSPITAL | Age: 49
Setting detail: RECURRING SERIES
Discharge: HOME | End: 2024-05-09
Payer: COMMERCIAL

## 2024-05-06 PROCEDURE — 97110 THERAPEUTIC EXERCISES: CPT

## 2024-05-06 PROCEDURE — 97140 MANUAL THERAPY 1/> REGIONS: CPT

## 2024-05-06 PROCEDURE — 97530 THERAPEUTIC ACTIVITIES: CPT

## 2024-05-06 NOTE — PROGRESS NOTES
PHYSICAL / OCCUPATIONAL THERAPY - DAILY TREATMENT NOTE    Patient Name: Dejuan Church    Date: 2024    : 1975  Insurance: Payor: AETNA / Plan: AETNA / Product Type: *No Product type* /      Patient  verified Yes     Visit #   Current / Total 19 24   Time   In / Out 142 227   Pain   In / Out 0/10 010   Subjective Functional Status/Changes: Pt reports no pain today. 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     TREATMENT AREA =  Pain in right shoulder [M25.511]    OBJECTIVE         Therapeutic Procedures:    Tx Min Billable or 1:1 Min (if diff from Tx Min) Procedure, Rationale, Specifics   23  17686 Therapeutic Exercise (timed):  increase ROM, strength, coordination, balance, and proprioception to improve patient's ability to progress to PLOF and address remaining functional goals. (see flow sheet as applicable)     Details if applicable:       14  31457 Therapeutic Activity (timed):  use of dynamic activities replicating functional movements to increase ROM, strength, coordination, balance, and proprioception in order to improve patient's ability to progress to PLOF and address remaining functional goals.  (see flow sheet as applicable)     Details if applicable:     8  70939 Manual Therapy (timed):  decrease pain, increase ROM, increase tissue extensibility, and decrease trigger points to improve patient's ability to progress to PLOF and address remaining functional goals.  The manual therapy interventions were performed at a separate and distinct time from the therapeutic activities interventions . (see flow sheet as applicable)     Details if applicable:  STM to R infraspinatus, R teres minor, R LHB; R shoulder PC/IC mobs; R shoulder PROM into flex, ER all performed in supine          Details if applicable:            Details if applicable:     45  [facility] Totals Reminder: bill using total billable min of TIMED therapeutic procedures (example: do not include

## 2024-05-08 ENCOUNTER — HOSPITAL ENCOUNTER (OUTPATIENT)
Facility: HOSPITAL | Age: 49
Setting detail: RECURRING SERIES
Discharge: HOME | End: 2024-05-11
Payer: COMMERCIAL

## 2024-05-08 PROCEDURE — 97530 THERAPEUTIC ACTIVITIES: CPT

## 2024-05-08 PROCEDURE — 97110 THERAPEUTIC EXERCISES: CPT

## 2024-05-08 NOTE — PROGRESS NOTES
R shoulder strength: flex 4/5, abd 4/5 (P!), ER 4/5, IR 4+/5 (P!); 5/8/24: Progressing with functional strength  4. Patient will report reduction in pain at worst to 1-2/10 in order to improve tolerance to work activities. Current Status: 3-4/10  4/25/24: 3-4/10 pain post tx session with FIR stretch and doorway ER stretch        Next PN/ RC due 5/18/24  Auth due (visit number/ date) 60 V; 12/31/24    PLAN  - Continue Plan of Care  - Upgrade activities as tolerated    Michael Robin PTA    5/8/2024    11:36 AM    Future Appointments   Date Time Provider Department Center   5/8/2024  3:00 PM Michael Robin PTA [facility]   5/15/2024  3:00 PM Michael Robin PTA [facility]   5/17/2024  3:00 PM Kane Sotomayor, PT [facility]

## 2024-05-15 ENCOUNTER — HOSPITAL ENCOUNTER (OUTPATIENT)
Facility: HOSPITAL | Age: 49
Setting detail: RECURRING SERIES
Discharge: HOME | End: 2024-05-18
Payer: COMMERCIAL

## 2024-05-15 PROCEDURE — 97140 MANUAL THERAPY 1/> REGIONS: CPT

## 2024-05-15 NOTE — PROGRESS NOTES
to work activities. Current Status: 3-4/10  4/25/24: 3-4/10 pain post tx session with FIR stretch and doorway ER stretch        Next PN/ RC due 5/18/24  Auth due (visit number/ date) 60 V; 12/31/24    PLAN  - Continue Plan of Care  - Upgrade activities as tolerated    Michael Robin PTA    5/15/2024    10:10 AM    Future Appointments   Date Time Provider Department Center   5/15/2024  3:00 PM Michael Robin PTA [facility]   5/17/2024  3:00 PM Kane Sotomayor, PT [facility]

## 2024-05-17 ENCOUNTER — HOSPITAL ENCOUNTER (OUTPATIENT)
Facility: HOSPITAL | Age: 49
Setting detail: RECURRING SERIES
Discharge: HOME | End: 2024-05-20
Payer: COMMERCIAL

## 2024-05-17 PROCEDURE — 97535 SELF CARE MNGMENT TRAINING: CPT

## 2024-05-17 PROCEDURE — 97530 THERAPEUTIC ACTIVITIES: CPT
